# Patient Record
(demographics unavailable — no encounter records)

---

## 2018-04-14 NOTE — CT
CT CONTRAST ENHANCED ABDOMEN AND PELVIS:

4/14/;18

 

HISTORY: 

Umbilical hernia.

 

Contrast enhanced CT images of the abdomen and pelvis is obtained after administration of IV and oral
 contrast. 

 

The lung bases are unremarkable. 

 

No evidence of free intraperitoneal air is seen. 

 

The liver is unremarkable. Splenic calcified granuloma is seen. The gallbladder and pancreas are unre
markable. Adrenal glands and kidneys are unremarkable. 

 

There is an umbilical hernia with some intraperitoneal fat extending in. No evidence of bowel obstruc
tion or entrapment seen. A normal appendix is visualized. 

 

No evidence of periaortic or pelvic lymphadenopathy is seen. 

 

IMPRESSION:  

Umbilical hernia, otherwise unremarkable CT images abdomen and pelvis. 

 

POS: [facility]

## 2018-04-14 NOTE — HP
DATE OF ADMISSION:  04/14/2018

 

CHIEF COMPLAINT:  Abdominal pain.

 

HISTORY OF PRESENT ILLNESS:  Mr. Rucker is a 34-year-old man with an umbilical hernia, diagnosed bac
k in November.  For the past week, this has been hard and painful.  He has been unable to push it bac
k in and it is causing him increasing distress.  He states that when the ER doctor tried to push it i
n, it caused pain throughout his abdomen, but usually the pain is just at the belly button.  He denie
s fevers, chills, nausea or vomiting and has been eating normally and having normal bowel movements.

 

PAST MEDICAL HISTORY:  None.

 

MEDICATIONS:  None.

 

ALLERGIES:  He had a reaction to an unknown medication as a child, but does not remember what it was.


 

PAST SURGICAL HISTORY:  I and D of perirectal abscess several years ago.  He is completely healed fro
m that.

 

FAMILY HISTORY:  Diabetes in a brother.

 

SOCIAL HISTORY:  He does not smoke, drink or use illicit drugs.  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
k, but cannot recall any one particular incident that preceded the onset of the hernia.

 

REVIEW OF SYSTEMS:  Ten system review of systems is negative except per HPI.

 

PHYSICAL EXAMINATION:

VITAL SIGNS:  The patient is afebrile.  Vital signs are unremarkable.

HEENT:  Normal.

NECK:  Supple without lymphadenopathy or thyroid nodules.

HEENT:  He is not flushed or toxic in appearance.  He is not jaundiced or icteric.

HEART:  Regular in its rate and rhythm without murmurs, rubs or gallops.

LUNGS:  Clear to auscultation bilaterally.

ABDOMEN:  Soft and nondistended.  There is no generalized tenderness, but he is extremely tender with
 attempts to reduce his firm irreducible umbilical hernia.  The skin overlying the hernia is slightly
 reddened.  No other palpable hernias.  He does have what feels like a small lipoma in the right lowe
r quadrant, which is nontender.

EXTREMITIES:  Warm and well perfused without edema.

NEUROLOGIC:  No focal deficits.

PSYCHIATRIC:  Alert, oriented and appropriate.

 

LABORATORY AND X-RAY FINDINGS:  CT images are reviewed and I agree with the written report.  He has a
 fat containing umbilical hernia.  No evidence of bowel involvement and no evidence of obstruction.

 

ASSESSMENT:  Incarcerated umbilical hernia, which is very symptomatic.  Skin is rather stretched out 
over the hernia and potentially at risk.  There does not appear to be any bowel involvement, so this 
is not emergent, but I have recommended that he go ahead and have this repaired.  He ate lunch at 10:
30, so we will schedule the surgery for 06:30.  We will likely use mesh to reinforce the repair, lilliana campbell given his physically strenuous job.  The operation and its inherent risks were discussed with 
the patient and his family and these include, but are not limited to bleeding, infection, risks of an
esthesia, damage to nearby structures including intestine and blood vessels, recurrence of the hernia
 and mesh infection requiring mesh explantation.  They understand and accept these risks and wish to 
proceed.  I have ordered Ancef on call to the OR.

## 2018-04-16 NOTE — OP
PROCEDURE:  Repair of incarcerated umbilical hernia with mesh on 04/14/2018.

 

PREOPERATIVE DIAGNOSIS:  Umbilical hernia, incarcerated.

 

POSTOPERATIVE DIAGNOSIS:  Umbilical hernia, incarcerated.

 

HISTORY:  Mr. Rucker is a 35-year-old man with an umbilical hernia since last fall, which has recent
ly become very painful.  He came to the emergency room where he was noted to have an incarcerated her
adela with some discoloration of the overlying skin, for which an urgent repair was recommended.  No samuel
wel involvement was seen.

 

PROCEDURE IN DETAIL:  After informed consent was obtained and appropriate preoperative antibiotics ad
ministered, the patient was taken to the operating room.  He was placed in supine position and anesth
esia was administered.  He was prepped and draped in a standard sterile fashion and local anesthesia 
infused to the skin and subcutaneous tissues overlying the umbilical hernia.  A circumumbilical incis
ion was made and dissection carried down to the hernia sac, which was dissected off of the overlying 
skin and surrounding subcutaneous tissues.  This was traced down to the fascial defect, which was rel
atively small.  The hernia sac was opened and incarcerated omentum identified.  Some adhesions were t
aken down, but due to the inflamed nature of the omentum that was unable to be reduced.  Therefore, t
he inflamed omentum was ligated and resected and the omental stump was able to be reduced through the
 fascial defect.  The hernia sac was then excised and a preperitoneal space developed bluntly.  Since
 the defect itself was small, measuring under 2 cm.  A 4.3 cm Ventralex patch was selected and placed
 into the preperitoneal space.  The fascia was closed incorporating the central strap into the closur
e and the excess strap trimmed.  The subcutaneous tissues were irrigated and hemostasis verified.  Ad
ditional local anesthesia was infused for postoperative pain control.  The umbilicus was tacked down 
to the fascia and the subcutaneous tissues reapproximated with 3-0 Monocryl suture.  The skin was jerod
sed with 4-0 subcuticular Monocryl suture.  Dermabond dressings were applied and following which a pr
essure dressing was placed and the patient was extubated and taken to the recovery room in good condi
tion.  Estimated blood loss was minimal.  There were no complications.  There were no specimens.